# Patient Record
Sex: FEMALE | Race: WHITE | NOT HISPANIC OR LATINO
[De-identification: names, ages, dates, MRNs, and addresses within clinical notes are randomized per-mention and may not be internally consistent; named-entity substitution may affect disease eponyms.]

---

## 2020-10-12 ENCOUNTER — APPOINTMENT (OUTPATIENT)
Dept: ORTHOPEDIC SURGERY | Facility: CLINIC | Age: 80
End: 2020-10-12
Payer: MEDICARE

## 2020-10-12 VITALS — BODY MASS INDEX: 24.66 KG/M2 | WEIGHT: 134 LBS | HEIGHT: 62 IN

## 2020-10-12 DIAGNOSIS — Z78.9 OTHER SPECIFIED HEALTH STATUS: ICD-10-CM

## 2020-10-12 DIAGNOSIS — E78.00 PURE HYPERCHOLESTEROLEMIA, UNSPECIFIED: ICD-10-CM

## 2020-10-12 DIAGNOSIS — Z56.0 UNEMPLOYMENT, UNSPECIFIED: ICD-10-CM

## 2020-10-12 DIAGNOSIS — S83.207A UNSPECIFIED TEAR OF UNSPECIFIED MENISCUS, CURRENT INJURY, LEFT KNEE, INITIAL ENCOUNTER: ICD-10-CM

## 2020-10-12 DIAGNOSIS — R12 HEARTBURN: ICD-10-CM

## 2020-10-12 PROBLEM — Z00.00 ENCOUNTER FOR PREVENTIVE HEALTH EXAMINATION: Status: ACTIVE | Noted: 2020-10-12

## 2020-10-12 PROCEDURE — 20610 DRAIN/INJ JOINT/BURSA W/O US: CPT | Mod: LT

## 2020-10-12 PROCEDURE — 99204 OFFICE O/P NEW MOD 45 MIN: CPT | Mod: 25

## 2020-10-12 PROCEDURE — 73564 X-RAY EXAM KNEE 4 OR MORE: CPT | Mod: LT

## 2020-10-12 RX ORDER — FENOFIBRATE 120 MG/1
TABLET ORAL
Refills: 0 | Status: ACTIVE | COMMUNITY

## 2020-10-12 RX ORDER — ZOLPIDEM TARTRATE 5 MG/1
TABLET, FILM COATED ORAL
Refills: 0 | Status: ACTIVE | COMMUNITY

## 2020-10-12 RX ORDER — MELOXICAM 15 MG/1
15 TABLET ORAL
Qty: 30 | Refills: 1 | Status: ACTIVE | COMMUNITY
Start: 2020-10-12 | End: 1900-01-01

## 2020-10-12 RX ORDER — PANTOPRAZOLE 20 MG/1
20 TABLET, DELAYED RELEASE ORAL
Refills: 0 | Status: ACTIVE | COMMUNITY

## 2020-10-12 SDOH — ECONOMIC STABILITY - INCOME SECURITY: UNEMPLOYMENT, UNSPECIFIED: Z56.0

## 2020-10-12 NOTE — DISCUSSION/SUMMARY
[de-identified] : This patient has mild left knee patellofemoral osteoarthritis as well as a lateral meniscal tear.  The patient is not an appropriate candidate for surgical intervention at this time. An extensive discussion was conducted on the natural history of the disease and the variety of surgical and non-surgical options available to the patient including, but not limited to non-steroidal anti-inflammatory medications, steroid injections, physical therapy, maintenance of ideal body weight, and reduction of activity.  I recommended and prescribed a course of Mobic and physical therapy.  The patient is also encouraged to trial a neoprene sleeve knee brace which can be purchased OTC.  The patient is also encouraged to consider use of a cane.  Today we performed a left knee intra-articular cortisone injection.  The patient will schedule an appointment as needed.\par \par

## 2020-10-12 NOTE — HISTORY OF PRESENT ILLNESS
[de-identified] : This is very nice 79-year-old female experiencing chronic left knee pain, which is severe in intensity. The pain substantially limits activities of daily living. Walking tolerance is reduced.  Stairs exacerbate her pain.  The knee is not giving way.  Denies any catching clicking or locking in the knee.  Does not use a knee sleeve.  Does not use a cane or walker.  Does not take any medications for this.  Has not tried physical therapy recently for this but did try some physical therapy in January.  The patient denies any radiation of the pain to the feet and it is not associated with numbness, tingling, or weakness.

## 2020-10-12 NOTE — PHYSICAL EXAM
[de-identified] : Patient is well nourished, well-developed, in no acute distress, with appropriate mood and affect. The patient is oriented to time, place, and person. Respirations are even and unlabored. Gait evaluation does reveal a limp. There is no inguinal adenopathy. Examination of the contralateral knee shows normal range of motion, strength, no tenderness, and intact skin. The affected limb is well-perfused, without skin lesions, shows a grossly normal motor and sensory examination. Knee motion is significantly reduced and does cause significant pain. The knee moves from 0 to 125 degrees. The knee is stable within that range-of-motion to AP and ML stress. The alignment of the knee is 5 degrees valgus. Muscle strength is normal. Pedal pulses are palpable. Hip examination was negative.\par  [de-identified] : Long standing knee, AP knee, lateral knee, and patellar views of the left knee were ordered and taken in the office and demonstrate mild degenerative joint disease of the patellofemoral compartment of the knee with joint space narrowing, osteophyte formation, and subchondral sclerosis.\par \par The patient brings with her an MRI of the left knee that demonstrates mild patellofemoral degenerative joint disease as well as a lateral meniscal tear.

## 2022-11-07 ENCOUNTER — APPOINTMENT (OUTPATIENT)
Dept: ORTHOPEDIC SURGERY | Facility: CLINIC | Age: 82
End: 2022-11-07

## 2022-11-07 VITALS
HEART RATE: 73 BPM | WEIGHT: 135.91 LBS | BODY MASS INDEX: 25.01 KG/M2 | TEMPERATURE: 97.7 F | HEIGHT: 62 IN | DIASTOLIC BLOOD PRESSURE: 77 MMHG | OXYGEN SATURATION: 98 % | SYSTOLIC BLOOD PRESSURE: 125 MMHG

## 2022-11-07 DIAGNOSIS — M65.322 TRIGGER FINGER, LEFT INDEX FINGER: ICD-10-CM

## 2022-11-07 DIAGNOSIS — M25.562 PAIN IN LEFT KNEE: ICD-10-CM

## 2022-11-07 PROCEDURE — 20610 DRAIN/INJ JOINT/BURSA W/O US: CPT | Mod: LT

## 2022-11-07 PROCEDURE — 73560 X-RAY EXAM OF KNEE 1 OR 2: CPT | Mod: RT

## 2022-11-07 PROCEDURE — 73564 X-RAY EXAM KNEE 4 OR MORE: CPT | Mod: LT

## 2022-11-07 PROCEDURE — 99214 OFFICE O/P EST MOD 30 MIN: CPT | Mod: 25

## 2022-11-07 NOTE — PHYSICAL EXAM
[de-identified] : Constitutional - the patient is of normal build and nutrition.  She comes to our office with her .  The patient remains oriented to person, time, and  place.  Mood is normal. Vital signs as recorded.  The patients gait is no limp but pain in her left knee.  She walks with a slightly bent left knee. The patient has satisfactory  balance and can stand on toes and heels.\par \par The patient has no difficulty with respiration. Respiration at rest is a normal rate. The patient is not short of breath and has not become short of breath with short ambulation. There is no audible wheezing. No coughing.\par \par Skin is normal for the patient's age. There are no abnormal masses or lymph nodes which stand out in the lower extremities.\par \par Spine - deep tendon reflexes are symmetric. Motor and sensory are symmetric.\par \par \par UPPER EXTREMITIES \par \par Shoulders her shoulders have full symmetric motion bilaterally but with abduction and abduction and rotation of her right shoulder she is feeling some aching in her shoulder this is very consistent with some mild shoulder strain or rotator impingement.\par \par There is normal motion in the wrists and elbows.\par \par She is having some problem with her left hand she has some numbness and today she points to her index and middle fingers she also has a snapping finger in her index finger of her left hand.  On palpation of her hand she has a very early Dupuytren's contracture.  I when tapping on her wrist she may have a positive Tinel's.  For this she is being referred to our hand orthopedist.\par \par Circulation appears satisfactory with pedal pulses present.  There is no major edema in the lower legs. No skin tenderness or increased temperature. No major varicosities.\par \par HIP EXAMINATION the abduction and abduction as well as rotation measurements were taken with the hip in flexion.\par \par Motion\par There is symmetric motion with flexion 135 degrees, abduction 80 degrees, adduction 30 degrees, external rotation 80 degrees, internal rotation 20 degrees.\par \par The hips have good range of motion. There is good strength across the hips. There is no crepitus in either hip. The alignment of the hips is normal.\par \par \par KNEE EXAMINATION\par \par Motion\par Right Knee has 0 to 135 degrees of motion with good medial  lateral and anterior posterior stability.  There is no major effusion.  There is no Baker's cyst.  There is no significant patellofemoral crepitus.  The patient has satisfactory strength across the knee.               \par Left  Knee   has 10 to 120 degrees of motion with good medial lateral and anterior posterior stability.  She has a definite anterior effusion and a very small Baker's cyst.  There is patellofemoral crepitus and she has pain with compression of her patella as well as any attempt to subluxate her patella.  He does guard the strength in this knee.  She says that this knee is more uncomfortable when she goes up and down stairs.\par \par \par \par Ankle and foot examination\par Of the ankle has normal motion.  There is normal ankle stability.  The patient has no major abnormalities of the foot.\par \par \par \par  [de-identified] : 4 views were done of the patient's left knee with an AP of the right knee on the AP view medial lateral joint spaces are maintained to both the right and the left knees.  On the Downs view she appears to be bone against bone contact in her lateral compartment and she has a known degenerative torn lateral meniscus.  He has severe patellofemoral degenerative arthritis with me what may be bone against bone contact throughout the lateral facet.  Impression osteoarthritis left knee. PCP

## 2022-11-07 NOTE — PROCEDURE
[de-identified] : Procedure Note:\par \par Anatomic Location:  Left Knee\par \par Diagnosis:  Arthritis\par \par Procedure:  Injection of 2cc  of Marcaine 0.25% plain and Celestone 1cc, 6mg\par \par Local Spray: Ethyl Chloride.\par \par \par Patient has consented for the procedure.\par \par Injection  through a lateral parapatella approach.\par \par Patient tolerated the procedure well.\par \par Patient instructed to call the office if any reaction, fever, chills, increased erythema or swelling.   [phone].

## 2022-11-07 NOTE — HISTORY OF PRESENT ILLNESS
[de-identified] : This is very nice 81-year-old female experiencing chronic left knee pain, which is severe in intensity. The pain substantially limits activities of daily living. Walking tolerance is reduced. Stairs exacerbate her pain. The knee is not giving way. Denies any catching clicking or locking in the knee. Does not use a knee sleeve. Does not use a cane or walker. Does not take any medications for this. Has not tried physical therapy recently for this but did try some physical therapy in the past. The patient denies any radiation of the pain to the feet and it is not associated with numbness, tingling, or weakness. She most recently was seen by Dr. Gray Reddy in October 2020 for this issue, who administered a cortisone injection to the patient's left knee, prescribed her Meloxicam, and indicated her for physical therapy.

## 2022-11-07 NOTE — DISCUSSION/SUMMARY
[de-identified] : As far as the patient's knee she does have a flexion contracture limited motion and marked pain with stairs and walking she could benefit in the future from left total knee replacement at this time she may have received good response from previous injections with cortisone she is given another injection which she tolerated well.  She will see hand orthopedic about her left hand trigger finger possible carpal tunnel and very early Dupuytren's.  Return visit in 3 months

## 2023-02-15 ENCOUNTER — APPOINTMENT (OUTPATIENT)
Dept: ORTHOPEDIC SURGERY | Facility: CLINIC | Age: 83
End: 2023-02-15

## 2023-02-16 ENCOUNTER — APPOINTMENT (OUTPATIENT)
Dept: ORTHOPEDIC SURGERY | Facility: CLINIC | Age: 83
End: 2023-02-16
Payer: MEDICARE

## 2023-02-16 VITALS
HEART RATE: 64 BPM | DIASTOLIC BLOOD PRESSURE: 74 MMHG | SYSTOLIC BLOOD PRESSURE: 118 MMHG | TEMPERATURE: 97.7 F | WEIGHT: 135 LBS | HEIGHT: 62 IN | BODY MASS INDEX: 24.84 KG/M2

## 2023-02-16 DIAGNOSIS — M17.12 UNILATERAL PRIMARY OSTEOARTHRITIS, LEFT KNEE: ICD-10-CM

## 2023-02-16 PROCEDURE — 99213 OFFICE O/P EST LOW 20 MIN: CPT | Mod: 25

## 2023-02-16 PROCEDURE — 73562 X-RAY EXAM OF KNEE 3: CPT | Mod: LT

## 2023-02-16 PROCEDURE — 20610 DRAIN/INJ JOINT/BURSA W/O US: CPT | Mod: LT

## 2024-02-28 ENCOUNTER — INPATIENT (INPATIENT)
Facility: HOSPITAL | Age: 84
LOS: 0 days | Discharge: HOME | DRG: 607 | End: 2024-02-29
Attending: INTERNAL MEDICINE | Admitting: INTERNAL MEDICINE
Payer: MEDICARE

## 2024-02-28 VITALS
TEMPERATURE: 98 F | DIASTOLIC BLOOD PRESSURE: 77 MMHG | HEIGHT: 62 IN | HEART RATE: 71 BPM | SYSTOLIC BLOOD PRESSURE: 135 MMHG | WEIGHT: 139.99 LBS | RESPIRATION RATE: 18 BRPM | OXYGEN SATURATION: 97 %

## 2024-02-28 DIAGNOSIS — R21 RASH AND OTHER NONSPECIFIC SKIN ERUPTION: ICD-10-CM

## 2024-02-28 LAB
ALBUMIN SERPL ELPH-MCNC: 4.3 G/DL — SIGNIFICANT CHANGE UP (ref 3.3–5)
ALP SERPL-CCNC: 99 U/L — SIGNIFICANT CHANGE UP (ref 40–120)
ALT FLD-CCNC: 20 U/L — SIGNIFICANT CHANGE UP (ref 10–45)
ANION GAP SERPL CALC-SCNC: 12 MMOL/L — SIGNIFICANT CHANGE UP (ref 5–17)
APPEARANCE UR: ABNORMAL
APTT BLD: 35.9 SEC — HIGH (ref 24.5–35.6)
AST SERPL-CCNC: 25 U/L — SIGNIFICANT CHANGE UP (ref 10–40)
BACTERIA # UR AUTO: NEGATIVE /HPF — SIGNIFICANT CHANGE UP
BASOPHILS # BLD AUTO: 0.03 K/UL — SIGNIFICANT CHANGE UP (ref 0–0.2)
BASOPHILS NFR BLD AUTO: 0.2 % — SIGNIFICANT CHANGE UP (ref 0–2)
BILIRUB SERPL-MCNC: 0.3 MG/DL — SIGNIFICANT CHANGE UP (ref 0.2–1.2)
BILIRUB UR-MCNC: NEGATIVE — SIGNIFICANT CHANGE UP
BUN SERPL-MCNC: 17 MG/DL — SIGNIFICANT CHANGE UP (ref 7–23)
CALCIUM SERPL-MCNC: 9.7 MG/DL — SIGNIFICANT CHANGE UP (ref 8.4–10.5)
CAST: 0 /LPF — SIGNIFICANT CHANGE UP (ref 0–4)
CHLORIDE SERPL-SCNC: 102 MMOL/L — SIGNIFICANT CHANGE UP (ref 96–108)
CO2 SERPL-SCNC: 25 MMOL/L — SIGNIFICANT CHANGE UP (ref 22–31)
COLOR SPEC: YELLOW — SIGNIFICANT CHANGE UP
CREAT SERPL-MCNC: 0.48 MG/DL — LOW (ref 0.5–1.3)
CRP SERPL-MCNC: <3 MG/L — SIGNIFICANT CHANGE UP (ref 0–4)
DIFF PNL FLD: ABNORMAL
EGFR: 94 ML/MIN/1.73M2 — SIGNIFICANT CHANGE UP
EOSINOPHIL # BLD AUTO: 0.35 K/UL — SIGNIFICANT CHANGE UP (ref 0–0.5)
EOSINOPHIL NFR BLD AUTO: 2.8 % — SIGNIFICANT CHANGE UP (ref 0–6)
ERYTHROCYTE [SEDIMENTATION RATE] IN BLOOD: 20 MM/HR — SIGNIFICANT CHANGE UP (ref 0–20)
GLUCOSE SERPL-MCNC: 111 MG/DL — HIGH (ref 70–99)
GLUCOSE UR QL: NEGATIVE MG/DL — SIGNIFICANT CHANGE UP
HCT VFR BLD CALC: 40.6 % — SIGNIFICANT CHANGE UP (ref 34.5–45)
HGB BLD-MCNC: 13.7 G/DL — SIGNIFICANT CHANGE UP (ref 11.5–15.5)
IMM GRANULOCYTES NFR BLD AUTO: 0.4 % — SIGNIFICANT CHANGE UP (ref 0–0.9)
INR BLD: 1.07 RATIO — SIGNIFICANT CHANGE UP (ref 0.85–1.18)
KETONES UR-MCNC: NEGATIVE MG/DL — SIGNIFICANT CHANGE UP
LEUKOCYTE ESTERASE UR-ACNC: NEGATIVE — SIGNIFICANT CHANGE UP
LYMPHOCYTES # BLD AUTO: 1.03 K/UL — SIGNIFICANT CHANGE UP (ref 1–3.3)
LYMPHOCYTES # BLD AUTO: 8.2 % — LOW (ref 13–44)
MCHC RBC-ENTMCNC: 33.1 PG — SIGNIFICANT CHANGE UP (ref 27–34)
MCHC RBC-ENTMCNC: 33.7 GM/DL — SIGNIFICANT CHANGE UP (ref 32–36)
MCV RBC AUTO: 98.1 FL — SIGNIFICANT CHANGE UP (ref 80–100)
MONOCYTES # BLD AUTO: 0.52 K/UL — SIGNIFICANT CHANGE UP (ref 0–0.9)
MONOCYTES NFR BLD AUTO: 4.1 % — SIGNIFICANT CHANGE UP (ref 2–14)
NEUTROPHILS # BLD AUTO: 10.56 K/UL — HIGH (ref 1.8–7.4)
NEUTROPHILS NFR BLD AUTO: 84.3 % — HIGH (ref 43–77)
NITRITE UR-MCNC: NEGATIVE — SIGNIFICANT CHANGE UP
NRBC # BLD: 0 /100 WBCS — SIGNIFICANT CHANGE UP (ref 0–0)
PH UR: 8 — SIGNIFICANT CHANGE UP (ref 5–8)
PLATELET # BLD AUTO: 252 K/UL — SIGNIFICANT CHANGE UP (ref 150–400)
POTASSIUM SERPL-MCNC: 4.1 MMOL/L — SIGNIFICANT CHANGE UP (ref 3.5–5.3)
POTASSIUM SERPL-SCNC: 4.1 MMOL/L — SIGNIFICANT CHANGE UP (ref 3.5–5.3)
PROCALCITONIN SERPL-MCNC: 0.02 NG/ML — SIGNIFICANT CHANGE UP (ref 0.02–0.1)
PROT SERPL-MCNC: 7.4 G/DL — SIGNIFICANT CHANGE UP (ref 6–8.3)
PROT UR-MCNC: NEGATIVE MG/DL — SIGNIFICANT CHANGE UP
PROTHROM AB SERPL-ACNC: 11.2 SEC — SIGNIFICANT CHANGE UP (ref 9.5–13)
RBC # BLD: 4.14 M/UL — SIGNIFICANT CHANGE UP (ref 3.8–5.2)
RBC # FLD: 12.7 % — SIGNIFICANT CHANGE UP (ref 10.3–14.5)
RBC CASTS # UR COMP ASSIST: 11 /HPF — HIGH (ref 0–4)
SODIUM SERPL-SCNC: 139 MMOL/L — SIGNIFICANT CHANGE UP (ref 135–145)
SP GR SPEC: 1.02 — SIGNIFICANT CHANGE UP (ref 1–1.03)
SQUAMOUS # UR AUTO: 0 /HPF — SIGNIFICANT CHANGE UP (ref 0–5)
UROBILINOGEN FLD QL: 0.2 MG/DL — SIGNIFICANT CHANGE UP (ref 0.2–1)
WBC # BLD: 12.54 K/UL — HIGH (ref 3.8–10.5)
WBC # FLD AUTO: 12.54 K/UL — HIGH (ref 3.8–10.5)
WBC UR QL: 2 /HPF — SIGNIFICANT CHANGE UP (ref 0–5)

## 2024-02-28 PROCEDURE — 71045 X-RAY EXAM CHEST 1 VIEW: CPT | Mod: 26

## 2024-02-28 PROCEDURE — 73590 X-RAY EXAM OF LOWER LEG: CPT | Mod: 26,50

## 2024-02-28 PROCEDURE — 99285 EMERGENCY DEPT VISIT HI MDM: CPT

## 2024-02-28 RX ORDER — SODIUM CHLORIDE 9 MG/ML
1000 INJECTION INTRAMUSCULAR; INTRAVENOUS; SUBCUTANEOUS ONCE
Refills: 0 | Status: COMPLETED | OUTPATIENT
Start: 2024-02-28 | End: 2024-02-28

## 2024-02-28 RX ORDER — DIPHENHYDRAMINE HCL 50 MG
25 CAPSULE ORAL ONCE
Refills: 0 | Status: COMPLETED | OUTPATIENT
Start: 2024-02-28 | End: 2024-02-28

## 2024-02-28 RX ADMIN — SODIUM CHLORIDE 1000 MILLILITER(S): 9 INJECTION INTRAMUSCULAR; INTRAVENOUS; SUBCUTANEOUS at 12:34

## 2024-02-28 RX ADMIN — SODIUM CHLORIDE 1000 MILLILITER(S): 9 INJECTION INTRAMUSCULAR; INTRAVENOUS; SUBCUTANEOUS at 13:02

## 2024-02-28 RX ADMIN — Medication 25 MILLIGRAM(S): at 15:03

## 2024-02-28 NOTE — ED ADULT TRIAGE NOTE - AS HEIGHT TYPE
Show Applicator Variable?: Yes Duration Of Freeze Thaw-Cycle (Seconds): 0 Render Note In Bullet Format When Appropriate: No Detail Level: Detailed Post-Care Instructions: You have just had cryotherapy for the treatment of a pre-cancerous or other benign (non-cancerous) skin lesions. You can expect the pain to rapidly decrease over the next 45 minutes. The area treated will initially turn red and over the next 72 hours turn brown to black. A scab will form that will peel off by itself within 5 to 7 days. A blister or reddish purple blood blister may form where the area has been treated. When the scab forms, you can apply Vaseline or Scar Recovery Gel twice a day to the wound. This product will improve the healing of the wound, decreasing the appearance of red or pink pigment changes in the wound. The gel has also been shown to significantly decrease itching while the wound heals. You can purchase the Scar Recovery Gel at our office. \\n\\nCan I wash or use makeup? Yes\\n\\nShould I break the blister should one form? \\nIf the blister is bothersome, you may break the blister with a clean needle if the lesion is on the body. However, we do not recommend doing this for lesions on the face. Keep the area dry and as clean as possible in order to prevent infection. Natural skin is the best protection to prevent infection. \\n\\nWill this leave a scar? \\nIt could, but it is very unlikely. However, it may leave a slight discoloration, which should be temporary. \\n\\nWhat if the skin growth doesn't go away after this has healed? \\nThe providers treated this area believing it did not have cancerous roots and was strictly limited to the surface of the skin as a pre-cancerous or benign growth would be. The growth may not disappear or it may return over a period of time. You need to have this area checked again at your follow-up appointment to ensure that it does not need further treatment or that it has resolved. Consent: The patient's consent was obtained including but not limited to risks of crusting, scabbing, blistering, scarring, darker or lighter pigmentary change, recurrence, incomplete removal and infection. Post-Care Instructions: Liquid Nitrogen (Freezing or Cryotherapy): This involves having a very cold spray applied to the lesion. This can be painful (topical anesthetic can be applied before or after treatment). After treatment, the area may form into a blister. The blister can be popped with a sterile needle, and covered with a dry bandage. You can resume normal activity; it is fine to get the area wet. It generally takes 4-6 treatments for the wart to resolve, treatments are done 2-3 weeks apart. Spray Paint Text: The liquid nitrogen was applied to the skin utilizing a spray paint frosting technique. Medical Necessity Clause: This procedure was medically necessary because the lesions that were treated were: Medical Necessity Information: It is in your best interest to select a reason for this procedure from the list below. All of these items fulfill various CMS LCD requirements except the new and changing color options. stated

## 2024-02-28 NOTE — H&P ADULT - NSHPLABSRESULTS_GEN_ALL_CORE
Lab Results:  CBC  CBC Full  -  ( 28 Feb 2024 13:26 )  WBC Count : 12.54 K/uL  RBC Count : 4.14 M/uL  Hemoglobin : 13.7 g/dL  Hematocrit : 40.6 %  Platelet Count - Automated : 252 K/uL  Mean Cell Volume : 98.1 fl  Mean Cell Hemoglobin : 33.1 pg  Mean Cell Hemoglobin Concentration : 33.7 gm/dL  Auto Neutrophil # : 10.56 K/uL  Auto Lymphocyte # : 1.03 K/uL  Auto Monocyte # : 0.52 K/uL  Auto Eosinophil # : 0.35 K/uL  Auto Basophil # : 0.03 K/uL  Auto Neutrophil % : 84.3 %  Auto Lymphocyte % : 8.2 %  Auto Monocyte % : 4.1 %  Auto Eosinophil % : 2.8 %  Auto Basophil % : 0.2 %    .		Differential:	[] Automated		[] Manual  Chemistry                        13.7   12.54 )-----------( 252      ( 28 Feb 2024 13:26 )             40.6     02-28    139  |  102  |  17  ----------------------------<  111<H>  4.1   |  25  |  0.48<L>    Ca    9.7      28 Feb 2024 13:26    TPro  7.4  /  Alb  4.3  /  TBili  0.3  /  DBili  x   /  AST  25  /  ALT  20  /  AlkPhos  99  02-28    LIVER FUNCTIONS - ( 28 Feb 2024 13:26 )  Alb: 4.3 g/dL / Pro: 7.4 g/dL / ALK PHOS: 99 U/L / ALT: 20 U/L / AST: 25 U/L / GGT: x           PT/INR - ( 28 Feb 2024 13:26 )   PT: 11.2 sec;   INR: 1.07 ratio         PTT - ( 28 Feb 2024 13:26 )  PTT:35.9 sec  Urinalysis Basic - ( 28 Feb 2024 13:26 )    Color: x / Appearance: x / SG: x / pH: x  Gluc: 111 mg/dL / Ketone: x  / Bili: x / Urobili: x   Blood: x / Protein: x / Nitrite: x   Leuk Esterase: x / RBC: x / WBC x   Sq Epi: x / Non Sq Epi: x / Bacteria: x            MICROBIOLOGY/CULTURES:      RADIOLOGY RESULTS: reviewed

## 2024-02-28 NOTE — H&P ADULT - ASSESSMENT
83-year-old female with history of carotid artery stenosis on Plavix, cognitive issues taking zolpidem and donepezil, who presents with bilateral lower extremity erythematous itchy rash which has been present since Saturday.  Patient states that she had a lesion biopsied by her dermatologist Dr. Xiomara De Leon last Wednesday.  Patient also stated that several hours prior to the rash onset, patient was eating cold cuts/mortadella.  Patient was subsequently seen by her PMD Dr. Toña Timmons who prescribed patient prednisone 5 mg daily, patient also took OTC anti-itch cream.  Patient states that her symptoms have been worsening and the rashes been progressing up bilateral lower extremity.    1 Generalized Rash   - unclear etiology   - rheum consult in am  - pt scared to be discharged  - will monitor     2 DVT prophylaxis   Compression stockings

## 2024-02-28 NOTE — H&P ADULT - NSHPPHYSICALEXAM_GEN_ALL_CORE
General: WN/WD NAD  PERRLA  Neurology: A&Ox3, nonfocal, ELIZALDE x 4  Respiratory: CTA B/L  CV: RRR, S1S2, no murmurs, rubs or gallops  Abdominal: Soft, NT, ND +BS, Last BM  Extremities: No edema, + peripheral pulses  Skin Normal

## 2024-02-28 NOTE — ED PROVIDER NOTE - PROGRESS NOTE DETAILS
Geoffrey Thakur MD: Labs reviewed, leukocytosis of 12.54, chemistry panel nonactionable.  CRP less than 3, procalcitonin 0.02.    My independent interpretation of the bilateral tibia/fibula x-ray images shows no acute fracture.   More details to be seen in the official radiologist read.    The patient will need to be admitted to the hospital for continued evaluation and management.  Discussed with the accepting physician regarding the initial presentation, diagnostic studies, treatments given in the ED, and current plan of care.  I, Dr. Geoffrey Thakur, spoke directly to the admitting attending physician, Dr. Daksha Scott, who accepted the patient to their service.  The patient was accepted by and endorsed to the medicine team.

## 2024-02-28 NOTE — H&P ADULT - HISTORY OF PRESENT ILLNESS
83-year-old female with history of carotid artery stenosis on Plavix, cognitive issues taking zolpidem and donepezil, who presents with bilateral lower extremity erythematous itchy rash which has been present since Saturday.  Patient states that she had a lesion biopsied by her dermatologist Dr. Xiomara De Leon last Wednesday.  Patient also stated that several hours prior to the rash onset, patient was eating cold cuts/mortadella.  Patient was subsequently seen by her PMD Dr. Toña Timmons who prescribed patient prednisone 5 mg daily, patient also took OTC anti-itch cream.  Patient states that her symptoms have been worsening and the rashes been progressing up bilateral lower extremity.

## 2024-02-28 NOTE — ED ADULT TRIAGE NOTE - CHIEF COMPLAINT QUOTE
bilateral lower extremity redness, rash and itching for few days now, saw her PMD and was prescribed an anti itch cream and Prednisone 5mg daily since Monday.

## 2024-02-28 NOTE — ED PROVIDER NOTE - ATTENDING APP SHARED VISIT CONTRIBUTION OF CARE
Geoffrey Thakur MD, Emergency Medicine Attending Physician:     HPI: 83-year-old female with history of carotid artery stenosis on Plavix, cognitive issues taking zolpidem and donepezil, who presents with bilateral lower extremity erythematous itchy rash which has been present since Saturday.  Patient states that she had a lesion biopsied by her dermatologist Dr. Xiomara De Leon last Wednesday.  Patient also stated that several hours prior to the rash onset, patient was eating cold cuts/mortadella.  Patient was subsequently seen by her PMD Dr. Toña Timmons who prescribed patient prednisone 5 mg daily, patient also took OTC anti-itch cream.  Patient states that her symptoms have been worsening and the rashes been progressing up bilateral lower extremity..     ROS: denies trauma, fevers, chills, cough, chest pain, shortness of breath, abdominal pain, nausea, vomiting, diaphoresis, dizziness, syncope, leg/calf pain/swelling, paresthesia, numbness, or weakness.    Exam: Vitals stable  GEN: In no acute distress, AAOx3  HENT: NCAT, no stridor  EYES: No icterus, no conjunctivitis  RESP: No respiratory distress, CTAB with no wheezing or rales or rhonchi  CV: No tachycardia, normal perfusion  Extremity: There is no calf tenderness or leg swelling. Negative Michael's sign.  NEURO: No focal neuro deficits, CN III through XII intact, normal 5/5 motor and normal sensation in all 4 extremities  Skin: Bilateral lower leg with confluent area of erythema which is nonblanchable, with scattered petechiae, mild warmth on palpation with minimal tenderness, no crepitus/fluctuance/desquamation.  Area of blanchable erythema and macular papular rash over the medial right thigh.    MDM: Will obtain labs to evaluate for hematologic disorder, metabolic derangements, hepatic and renal function, and screen for infection.  Will obtain x-ray of bilateral tib-fib to evaluate for acute bony pathology though unlikely.  Will obtain chest x-ray to evaluate for acute cardiopulmonary pathology.  Will give patient antihistamine and reassess. Geoffrey Thakur MD, Emergency Medicine Attending Physician:     HPI: 83-year-old female with history of carotid artery stenosis on Plavix, cognitive issues taking zolpidem and donepezil, who presents with bilateral lower extremity erythematous itchy rash which has been present since Saturday.  Patient states that she had a lesion biopsied by her dermatologist Dr. Xiomara De Leon last Wednesday.  Patient also stated that several hours prior to the rash onset, patient was eating cold cuts/mortadella.  Patient was subsequently seen by her PMD Dr. Toña Timmons who prescribed patient prednisone 5 mg daily, patient also took OTC anti-itch cream.  Patient states that her symptoms have been worsening and the rashes been progressing up bilateral lower extremity..     ROS: denies trauma, fevers, chills, cough, chest pain, shortness of breath, abdominal pain, nausea, vomiting, diaphoresis, dizziness, syncope, leg/calf pain/swelling, paresthesia, numbness, or weakness.    Exam: Vitals stable  GEN: In no acute distress, AAOx3  HENT: NCAT, no stridor  EYES: No icterus, no conjunctivitis  RESP: No respiratory distress, CTAB with no wheezing or rales or rhonchi  CV: No tachycardia, normal perfusion  Extremity: There is no calf tenderness or leg swelling. Negative Michael's sign.  NEURO: No focal neuro deficits, CN III through XII intact, normal 5/5 motor and normal sensation in all 4 extremities  Skin: Bilateral lower leg with confluent area of erythema which is nonblanchable, with scattered petechiae, mild warmth on palpation with minimal tenderness, no crepitus/fluctuance/desquamation.  Area of blanchable erythema and macular papular rash over the medial right thigh.    MDM: Will obtain labs to evaluate for hematologic disorder, metabolic derangements, hepatic and renal function, and screen for infection.  Will obtain x-ray of bilateral tib-fib to evaluate for acute bony pathology though unlikely.  Will obtain chest x-ray to evaluate for acute cardiopulmonary pathology.  Will give patient antihistamine and reassess.    My independent interpretation of the EKG shows:  Sinus rhythm with first-degree AV block, , with rate of 65 bpm, normal axis, no T wave inversions, no ST elevations or depressions.

## 2024-02-28 NOTE — ED ADULT NURSE NOTE - OBJECTIVE STATEMENT
1058 pt [id number] aox4 since Sunday bilateral lower extremity redness, rash and itching for few days now, saw her PMD and was prescribed an anti itch cream and Prednisone 5mg daily since Monday. 2/25/24

## 2024-02-28 NOTE — ED ADULT NURSE REASSESSMENT NOTE - NS ED NURSE REASSESS COMMENT FT1
Handoff taken from DURAN Judge in blue. Patient resting comfortably, breathing unlabored, VSS, no signs of acute distress, side rails raised, call bell within reach, comfort and safety maintained. To be moved to [facility].

## 2024-02-28 NOTE — ED ADULT NURSE NOTE - NSFALLHARMRISKINTERV_ED_ALL_ED

## 2024-02-29 ENCOUNTER — TRANSCRIPTION ENCOUNTER (OUTPATIENT)
Age: 84
End: 2024-02-29

## 2024-02-29 VITALS — OXYGEN SATURATION: 98 % | RESPIRATION RATE: 18 BRPM

## 2024-02-29 PROCEDURE — 85730 THROMBOPLASTIN TIME PARTIAL: CPT

## 2024-02-29 PROCEDURE — 87086 URINE CULTURE/COLONY COUNT: CPT

## 2024-02-29 PROCEDURE — 80053 COMPREHEN METABOLIC PANEL: CPT

## 2024-02-29 PROCEDURE — 85610 PROTHROMBIN TIME: CPT

## 2024-02-29 PROCEDURE — 84145 PROCALCITONIN (PCT): CPT

## 2024-02-29 PROCEDURE — 73590 X-RAY EXAM OF LOWER LEG: CPT

## 2024-02-29 PROCEDURE — 86140 C-REACTIVE PROTEIN: CPT

## 2024-02-29 PROCEDURE — 99221 1ST HOSP IP/OBS SF/LOW 40: CPT

## 2024-02-29 PROCEDURE — 85025 COMPLETE CBC W/AUTO DIFF WBC: CPT

## 2024-02-29 PROCEDURE — 81001 URINALYSIS AUTO W/SCOPE: CPT

## 2024-02-29 PROCEDURE — 99285 EMERGENCY DEPT VISIT HI MDM: CPT

## 2024-02-29 PROCEDURE — 71045 X-RAY EXAM CHEST 1 VIEW: CPT

## 2024-02-29 PROCEDURE — 85652 RBC SED RATE AUTOMATED: CPT

## 2024-02-29 RX ORDER — DIPHENHYDRAMINE HCL 50 MG
25 CAPSULE ORAL ONCE
Refills: 0 | Status: DISCONTINUED | OUTPATIENT
Start: 2024-02-29 | End: 2024-02-29

## 2024-02-29 RX ORDER — HYDROXYZINE HCL 10 MG
25 TABLET ORAL EVERY 6 HOURS
Refills: 0 | Status: DISCONTINUED | OUTPATIENT
Start: 2024-02-29 | End: 2024-02-29

## 2024-02-29 RX ORDER — HYDROXYZINE HCL 10 MG
1 TABLET ORAL
Qty: 28 | Refills: 0
Start: 2024-02-29 | End: 2024-03-06

## 2024-02-29 RX ORDER — ACETAMINOPHEN 500 MG
650 TABLET ORAL EVERY 6 HOURS
Refills: 0 | Status: DISCONTINUED | OUTPATIENT
Start: 2024-02-29 | End: 2024-02-29

## 2024-02-29 NOTE — DISCHARGE NOTE PROVIDER - NSDCQMCOGNITION_NEU_ALL_CORE
----- Message from Rhett Duong MD sent at 1/3/2019  5:38 AM CST -----  Inform patient that labs are normal. Try increasing fiber in his diet as discussed yesterday.  
Left message for patient to return call to receive test results  
Pt verbalized understanding with no further questions or concerns.    
No difficulties

## 2024-02-29 NOTE — CONSULT NOTE ADULT - ATTENDING COMMENTS
Modifications made to fellows note above   to follow up as outpatient in 1-2 weeks if rash is not resolving.     Meghann Espana MD  [facility] Division of Rheumatology   [phone]   [email or web address]

## 2024-02-29 NOTE — DISCHARGE NOTE NURSING/CASE MANAGEMENT/SOCIAL WORK - NSDCFUADDAPPT_GEN_ALL_CORE_FT
APPTS ARE READY TO BE MADE: [x] YES    Best Family or Patient Contact (if needed):    Additional Information about above appointments (if needed):    1: pcp, derm, rheum follow up  2:   3:     Other comments or requests:

## 2024-02-29 NOTE — CONSULT NOTE ADULT - ASSESSMENT
NOTE IS INCOMPLETE. PLEASE CHECK BACK LATER FOR FINAL ASSESSMENT AND RECOMMENDATIONS.    #Favor allergic contact dermatitis w/ id reaction. No evidence of vasculitis or systemic symptoms  - start triamcinolone 0.1% ointment BID to affected area for up to 2 weeks.  - start hydroxyzine 25 mg PO q6h PRN itch.  - Advised patient she should pursue patch testing as outpatient to determine etiology of recurrent allergic contact dermatitis.        The patient's chart was reviewed in addition to being seen and examined at bedside with the dermatology attending Dr. Cruz.  Recommendations were communicated with the primary team.  Please page [phone] with a [phone] call-back number for further related questions.    _______________ Thank you for allowing us to participate in the care of this patient.  Please re-consult Dermatology for any new or worsening developments.    Alyson Jiang MD  Resident Physician, PGY-2  [facility]  Pager: [phone]  Office: [phone]
82 yo F PMH carotid artery stenosis on Plavix, cognitive issues (on zolpidem/donepezil), b/l knee OA, shingles p/w 6 days itchy red rash    #acute generalized pruritic rash  =2/24 developed pruritic rash on  bilateral lower legs, which spread to upper legs, arms and neck  =evaluated by dermatology, favor allergic contact dermatitis - prescribed triamcinolone and hydroxyzine  =normal ESR/CRP, and no features or stigmata or rheumatologic disease on history or exam    Plan:  -low suspicion for vasculitis rash, however if rash does not resolve in 1-2 weeks, patient given instructions to follow up with rheumatology for further work up  -allergic rash may be 2/2 newly prescribed memantine - advised to discontinue for now    Meghann Espana MD  Rheumatology  [phone]  [address] [facility], [address], [place] [address]    Discussed with Attending Dr. Jovi Landry, DO  Rheumatology Fellow  Available on TEAMS

## 2024-02-29 NOTE — PROGRESS NOTE ADULT - ASSESSMENT
83-year-old female with history of carotid artery stenosis on Plavix, cognitive issues taking zolpidem and donepezil, who presents with bilateral lower extremity erythematous itchy rash which has been present since Saturday.  Patient states that she had a lesion biopsied by her dermatologist Dr. Xiomara De Leon last Wednesday.  Patient also stated that several hours prior to the rash onset, patient was eating cold cuts/mortadella.  Patient was subsequently seen by her PMD Dr. Toña Timmons who prescribed patient prednisone 5 mg daily, patient also took OTC anti-itch cream.  Patient states that her symptoms have been worsening and the rashes been progressing up bilateral lower extremity.    1 Generalized Rash   - unclear etiology   - rheum consult appreciated   - derm fu appreciated   - will monitor     2 DVT prophylaxis   Compression stockings

## 2024-02-29 NOTE — DISCHARGE NOTE NURSING/CASE MANAGEMENT/SOCIAL WORK - PATIENT PORTAL LINK FT
You can access the FollowMyHealth Patient Portal offered by [facility] by registering at the following website: [email or web address]. By joining [employer]’s FollowMyHealth portal, you will also be able to view your health information using other applications (apps) compatible with our system.

## 2024-02-29 NOTE — DISCHARGE NOTE PROVIDER - CARE PROVIDERS DIRECT ADDRESSES
,[email or web address]

## 2024-02-29 NOTE — DISCHARGE NOTE NURSING/CASE MANAGEMENT/SOCIAL WORK - NSDCPEFALRISK_GEN_ALL_CORE
For information on Fall & Injury Prevention, visit: [email or web address] OR  [email or web address] OR  https://www.cdc.gov/steadi/patient.html

## 2024-02-29 NOTE — PROGRESS NOTE ADULT - SUBJECTIVE AND OBJECTIVE BOX
Patient is a 83y old  Female who presents with a chief complaint of Rash (2024 12:45)    Date of servie : 24 @ 14:06  INTERVAL HPI/OVERNIGHT EVENTS:  T(C): 36.4 (24 @ 12:04), Max: 36.8 (24 @ 18:51)  HR: 60 (24 @ 12:04) (60 - 80)  BP: 111/67 (24 @ 12:04) (110/66 - 134/79)  RR: 18 (24 @ 12:04) (18 - 20)  SpO2: 97% (24 @ 12:04) (96% - 100%)  Wt(kg): --  I&O's Summary    2024 07:01  -  2024 14:06  --------------------------------------------------------  IN: 250 mL / OUT: 0 mL / NET: 250 mL        LABS:                        13.7   12.54 )-----------( 252      ( 2024 13:26 )             40.6         139  |  102  |  17  ----------------------------<  111<H>  4.1   |  25  |  0.48<L>    Ca    9.7      2024 13:26    TPro  7.4  /  Alb  4.3  /  TBili  0.3  /  DBili  x   /  AST  25  /  ALT  20  /  AlkPhos  99  -    PT/INR - ( 2024 13:26 )   PT: 11.2 sec;   INR: 1.07 ratio         PTT - ( 2024 13:26 )  PTT:35.9 sec  Urinalysis Basic - ( 2024 20:38 )    Color: Yellow / Appearance: Cloudy / S.017 / pH: x  Gluc: x / Ketone: Negative mg/dL  / Bili: Negative / Urobili: 0.2 mg/dL   Blood: x / Protein: Negative mg/dL / Nitrite: Negative   Leuk Esterase: Negative / RBC: 11 /HPF / WBC 2 /HPF   Sq Epi: x / Non Sq Epi: 0 /HPF / Bacteria: Negative /HPF      CAPILLARY BLOOD GLUCOSE            Urinalysis Basic - ( 2024 20:38 )    Color: Yellow / Appearance: Cloudy / S.017 / pH: x  Gluc: x / Ketone: Negative mg/dL  / Bili: Negative / Urobili: 0.2 mg/dL   Blood: x / Protein: Negative mg/dL / Nitrite: Negative   Leuk Esterase: Negative / RBC: 11 /HPF / WBC 2 /HPF   Sq Epi: x / Non Sq Epi: 0 /HPF / Bacteria: Negative /HPF        MEDICATIONS  (STANDING):  triamcinolone 0.1% Ointment 1 Application(s) Topical every 12 hours    MEDICATIONS  (PRN):  acetaminophen     Tablet .. 650 milliGRAM(s) Oral every 6 hours PRN Mild Pain (1 - 3)  diphenhydrAMINE 25 milliGRAM(s) Oral once PRN Rash and/or Itching  hydrOXYzine hydrochloride 25 milliGRAM(s) Oral every 6 hours PRN Itching          PHYSICAL EXAM:  GENERAL: NAD, well-groomed, well-developed  HEAD:  Atraumatic, Normocephalic  CHEST/LUNG: Clear to percussion bilaterally; No rales, rhonchi, wheezing, or rubs  HEART: Regular rate and rhythm; No murmurs, rubs, or gallops  ABDOMEN: Soft, Nontender, Nondistended; Bowel sounds present  EXTREMITIES:  2+ Peripheral Pulses, No clubbing, cyanosis, or edema  LYMPH: No lymphadenopathy noted  SKIN: No rashes or lesions    Care Discussed with Consultants/Other Providers [ ] YES  [ ] NO

## 2024-02-29 NOTE — CONSULT NOTE ADULT - SUBJECTIVE AND OBJECTIVE BOX
HPI:  83-year-old female with history of carotid artery stenosis on Plavix, cognitive issues taking zolpidem and donepezil, who presents with x6 day itchy red rash that began on her bilateral lower legs and began to spread to upper legs, arms and neck. She was seen by her primary care doctor, who prescribed prednisone 5mg daily on Wednesday which patient had been taking but rash continued to spread. She recently had a skin check from an outside dermatologist during which a lesion was biopsied on the left lower leg and patient was applying Aquaphor to wound daily since. He denies joint pain, abdominal pain, muscle aches, URI symptoms. She denies recent illness in the last six weeks, denies any new medication outside of prednisone.    Dermatology was consulted for an itchy x 6day red rash on the b/l extremities, upper extremities and neck. Seen by PCP, prescribed prednisone 5mg daily patient has been taking since Wednesday without improvement    Skin exam: red edematous papules coalescing into plaques on bilateral lower legs, upper arms, and left neck      PAST MEDICAL & SURGICAL HISTORY:      REVIEW OF SYSTEMS      General: no fevers/chills, no lethary	    Skin/Breast: see HPI  	  Ophthalmologic: no eye pain or change in vision  	  ENMT: no dysphagia or change in hearing    Respiratory and Thorax: no SOB or cough  	  Cardiovascular: no palpitations or chest pain    Gastrointestinal: no abdomenal pain or blood in stool     Genitourinary: no dysuria or frequency    Musculoskeletal: no joint pains or weakness	    Neurological:no weakness, numbness , or tingling    MEDICATIONS  (STANDING):    ALLERGIES: No Known Allergies      Social History:  neg    FAMILY HISTORY:        VITAL SIGNS LAST 24 HOURS:  T(F): 97.6 ( @ 12:04), Max: 98.2 ( @ 18:51)  HR: 60 ( @ 12:04) (60 - 80)  BP: 111/67 ( @ 12:04) (110/66 - 134/79)  RR: 18 ( @ 12:04) (18 - 20)    ___________________________________  Physical Exam:     The patient was alert and oriented X 3, and in no  apparent distress.  OP showed no ulcerations  There was no visible lymphadenopathy.  Conjunctiva were non injected  There was no clubbing or edema of extremities.  The scalp, hair, face, eyebrows, lips, OP, neck, chest, back,   extremities X 4, nails were examined.  There was no hyperhidrosis or bromhidrosis.    Of note on skin exam: see HPI  ____________________________________    LABS:                        13.7   12.54 )-----------( 252      ( 2024 13:26 )             40.6         139  |  102  |  17  ----------------------------<  111<H>  4.1   |  25  |  0.48<L>    Ca    9.7      2024 13:26    TPro  7.4  /  Alb  4.3  /  TBili  0.3  /  DBili  x   /  AST  25  /  ALT  20  /  AlkPhos  99  -28    PT/INR - ( 2024 13:26 )   PT: 11.2 sec;   INR: 1.07 ratio         PTT - ( 2024 13:26 )  PTT:35.9 sec  Urinalysis Basic - ( 2024 20:38 )    Color: Yellow / Appearance: Cloudy / S.017 / pH: x  Gluc: x / Ketone: Negative mg/dL  / Bili: Negative / Urobili: 0.2 mg/dL   Blood: x / Protein: Negative mg/dL / Nitrite: Negative   Leuk Esterase: Negative / RBC: 11 /HPF / WBC 2 /HPF   Sq Epi: x / Non Sq Epi: 0 /HPF / Bacteria: Negative /HPF

## 2024-02-29 NOTE — DISCHARGE NOTE PROVIDER - PROVIDER TOKENS
PROVIDER:[TOKEN:[[id number]],FOLLOWUP:[2 weeks],ESTABLISHEDPATIENT:[T]],PROVIDER:[TOKEN:[[id number]],FOLLOWUP:[2 weeks],ESTABLISHEDPATIENT:[T]] PROVIDER:[TOKEN:[[id number]],FOLLOWUP:[2 weeks],ESTABLISHEDPATIENT:[T]],PROVIDER:[TOKEN:[[id number]],FOLLOWUP:[2 weeks],ESTABLISHEDPATIENT:[T]],PROVIDER:[TOKEN:[[id number]],FOLLOWUP:[1 week],ESTABLISHEDPATIENT:[T]]

## 2024-02-29 NOTE — DISCHARGE NOTE PROVIDER - NSDCFUADDAPPT_GEN_ALL_CORE_FT
APPTS ARE READY TO BE MADE: [x] YES    Best Family or Patient Contact (if needed):    Additional Information about above appointments (if needed):    1:   2:   3:     Other comments or requests:    APPTS ARE READY TO BE MADE: [x] YES    Best Family or Patient Contact (if needed):    Additional Information about above appointments (if needed):    1: pcp, derm, rheum follow up  2:   3:     Other comments or requests:    APPTS ARE READY TO BE MADE: [x] YES    Best Family or Patient Contact (if needed):    Additional Information about above appointments (if needed):    1: pcp, derm, rheum follow up  2:   3:     Other comments or requests:     Patient informed us they already have secured a follow up appointment which is not visible on [facility]. Patient is scheduled to see Dr. Timmons today, 3/4, at [address] [place] [address]    Patient informed us they already have secured a follow up appointment which is not visible on [facility]. Patient is scheduled to see Dr. De Leon on Friday 3/8 at [address] [facility] [address]    Patient advised they did not want to proceed with scheduling appointments with the providers on their referrals. They will coordinate care on their own.

## 2024-02-29 NOTE — DISCHARGE NOTE PROVIDER - NSDCMRMEDTOKEN_GEN_ALL_CORE_FT
hydrOXYzine hydrochloride 25 mg oral tablet: 1 tab(s) orally every 6 hours as needed for Itching  triamcinolone 0.1% topical ointment: Apply topically to affected area 2 times a day 1 Apply topically to affected area every 12 hours

## 2024-02-29 NOTE — DISCHARGE NOTE PROVIDER - HOSPITAL COURSE
83-year-old female with history of carotid artery stenosis on Plavix, cognitive issues taking zolpidem and donepezil, who presents with bilateral lower extremity erythematous itchy rash which has been present since Saturday.  Patient states that she had a lesion biopsied by her dermatologist Dr. Xiomara De Leon last Wednesday.  Patient also stated that several hours prior to the rash onset, patient was eating cold cuts/mortadella.  Patient was subsequently seen by her PMD Dr. Toña Timmons who prescribed patient prednisone 5 mg daily, patient also took OTC anti-itch cream.  Patient states that her symptoms have been worsening and the rashes been progressing up bilateral lower extremity.     Pt w generalized rash more so b/l lower extremities. Derm and rheum saw pt, more likely contact dermatitis than vasculitis. PT to go home on triamcinolone ointment for itch and can follow up with her dermatologist and follow up with Dr Jovi lerner.

## 2024-02-29 NOTE — DISCHARGE NOTE PROVIDER - CARE PROVIDER_API CALL
Meghann Espana  Rheumatology  [address] [facility] [facility] [address]  [place] [address]  Phone: [phone]  Fax: [phone]  Established Patient  Follow Up Time: 2 weeks    Xiomara De Leon  Dermatology  [address] [place] [address]  Phone: [phone]  Fax: [phone]  Established Patient  Follow Up Time: 2 weeks   Meghann Espana  Rheumatology  [address] [facility] [facility] [address]  [place] [address]  Phone: [phone]  Fax: [phone]  Established Patient  Follow Up Time: 2 weeks    Xiomara De Leon  Dermatology  [address] [place] [address]  Phone: [phone]  Fax: [phone]  Established Patient  Follow Up Time: 2 weeks    Toña Timmons  Internal Medicine  [address] [place] [address]  Phone: [phone]  Fax: [phone]  Established Patient  Follow Up Time: 1 week

## 2024-02-29 NOTE — DISCHARGE NOTE PROVIDER - NSDCCPCAREPLAN_GEN_ALL_CORE_FT
PRINCIPAL DISCHARGE DIAGNOSIS  Diagnosis: Rash  Assessment and Plan of Treatment: You were seen for a rash. We feel it is an allergy or contact dermatitis, which is skin irritation. Use the cream we recommend for itch and if you need to take hydroxyzine or benadryl pills to help with the itch. Follow up with Dr Solano and Dr Espana     PRINCIPAL DISCHARGE DIAGNOSIS  Diagnosis: Rash  Assessment and Plan of Treatment: You were seen for a rash. We feel it is an allergy or contact dermatitis, which is skin irritation. Use the cream we recommend for itch and if you need to take hydroxyzine or benadryl pills to help with the itch. Follow up with Dr Solano and Dr Espana.  Talk to your primary care doctor because this rash may have started when you started taking memantine which could be causing the rash

## 2024-02-29 NOTE — CONSULT NOTE ADULT - SUBJECTIVE AND OBJECTIVE BOX
APOLONIA MARSH  [id number]    HISTORY OF PRESENT ILLNESS:    ED Vitals:  ED Labs:  ED Imaging:  ED Course:    Review of Systems:  Gen:  No fevers/chills, weight loss, alopecia  HEENT: No blurry vision, dry eyes, dry mouth, no difficulty swallowing, no oral or nasal ulcers  CVS: No chest pain    Resp: No SOB  GI: No N/V/C/D/abdominal pain, hematochezia  : No hematuria or dysuria  MSK: No joint pain or muscle weakness  Skin: No rash  Neuro: No headaches    MEDICATIONS  (STANDING):    MEDICATIONS  (PRN):  acetaminophen     Tablet .. 650 milliGRAM(s) Oral every 6 hours PRN Mild Pain (1 - 3)  diphenhydrAMINE 25 milliGRAM(s) Oral once PRN Rash and/or Itching      Allergies    No Known Allergies    Intolerances        PERTINENT MEDICATION HISTORY:    MEDICATIONS:    ALLERGIES:    SURGERIES:    HOSPITALIZATIONS:    FAMILY HISTORY:    TRAVEL HISTORY:    SOCIAL HISTORY:    FAMILY HISTORY:      Vital Signs Last 24 Hrs  T(C): 36.4 (2024 12:04), Max: 36.8 (2024 18:51)  T(F): 97.6 (2024 12:04), Max: 98.2 (2024 18:51)  HR: 60 (2024 12:04) (60 - 80)  BP: 111/67 (2024 12:04) (110/66 - 134/79)  BP(mean): 97 (2024 19:30) (97 - 97)  RR: 18 (2024 12:04) (18 - 20)  SpO2: 97% (2024 12:04) (96% - 100%)    Parameters below as of 2024 12:04  Patient On (Oxygen Delivery Method): room air        Physical Exam:  General: Breathing comfortably on room air, no distress  HEENT: EOMI, MMM, no oral ulcers  CVS: +S1/S2, RRR  Resp: CTA b/l. No crackles/wheezing  GI: Soft, NT/ND +BS  MSK: 5/5 muscle strength in arms and legs. B/l shoulder, elbow, wrist, MCP/PIP/DIP, Knee, ankle w/o swelling/erythema/or tenderness  Skin: no visible rashes    LABS:                        13.7   12.54 )-----------( 252      ( 2024 13:26 )             40.6         139  |  102  |  17  ----------------------------<  111<H>  4.1   |  25  |  0.48<L>    Ca    9.7      2024 13:26    TPro  7.4  /  Alb  4.3  /  TBili  0.3  /  DBili  x   /  AST  25  /  ALT  20  /  AlkPhos  99      PT/INR - ( 2024 13:26 )   PT: 11.2 sec;   INR: 1.07 ratio         PTT - ( 2024 13:26 )  PTT:35.9 sec  Urinalysis Basic - ( 2024 20:38 )    Color: Yellow / Appearance: Cloudy / S.017 / pH: x  Gluc: x / Ketone: Negative mg/dL  / Bili: Negative / Urobili: 0.2 mg/dL   Blood: x / Protein: Negative mg/dL / Nitrite: Negative   Leuk Esterase: Negative / RBC: 11 /HPF / WBC 2 /HPF   Sq Epi: x / Non Sq Epi: 0 /HPF / Bacteria: Negative /HPF        RADIOLOGY & ADDITIONAL STUDIES: Reviewed     APOLONIA MARSH  [id number]    HPI: 82 yo F PMH carotid artery stenosis on Plavix, cognitive issues (on zolpidem/donepezil), b/l knee OA, shingles p/w 6 days itchy red rash that began on her bilateral lower legs and began to spread to upper legs, arms and neck. [employer] : [id number] [place]. She was seen by her PCP on monday who prescribed prednisone 5mg daily. Pt says the prednisone has not helped muych. Of note, pt saw her dermatologist for annual skin check  and had mole biopsy on left lower leg. Was told to apply cream on wound daily which she did.  says she picked up new prescription of memantine a couple days before rash started. Denies hx of any prior rash, except for one time episode of previous shingles in past many years ago.     ROS:  -denies fever, chills, chest pain, abdominal pain, URI symptoms, sicca, raynauds, nasal/oral ulcers  -has chronic b/l knee pain worse w/ use     Meds:   Social hx: no tobacco/alcohol    MEDICATIONS  (STANDING):    MEDICATIONS  (PRN):  acetaminophen     Tablet .. 650 milliGRAM(s) Oral every 6 hours PRN Mild Pain (1 - 3)  diphenhydrAMINE 25 milliGRAM(s) Oral once PRN Rash and/or Itching      Allergies    No Known Allergies    Intolerances       Vital Signs Last 24 Hrs  T(C): 36.4 (2024 12:04), Max: 36.8 (2024 18:51)  T(F): 97.6 (2024 12:04), Max: 98.2 (2024 18:51)  HR: 60 (2024 12:04) (60 - 80)  BP: 111/67 (2024 12:04) (110/66 - 134/79)  BP(mean): 97 (2024 19:30) (97 - 97)  RR: 18 (2024 12:04) (18 - 20)  SpO2: 97% (2024 12:04) (96% - 100%)    Parameters below as of 2024 12:04  Patient On (Oxygen Delivery Method): room air        Physical Exam:  General: Breathing comfortably on room air, no distress  HEENT: EOMI, MMM, no oral ulcers  CVS: +S1/S2, RRR  Resp: CTA b/l. No crackles/wheezing  GI: Soft, NT/ND +BS  MSK: b/l knee crepitus, no effusion, intact ROM, squating of b/l hands w/ Leslie/Heberden's nodes  Skin: red confluent erythema on b/l shins/ankles, +hives upper arms, and left neck      LABS:                        13.7   12.54 )-----------( 252      ( 2024 13:26 )             40.6         139  |  102  |  17  ----------------------------<  111<H>  4.1   |  25  |  0.48<L>    Ca    9.7      2024 13:26    TPro  7.4  /  Alb  4.3  /  TBili  0.3  /  DBili  x   /  AST  25  /  ALT  20  /  AlkPhos  99      PT/INR - ( 2024 13:26 )   PT: 11.2 sec;   INR: 1.07 ratio         PTT - ( 2024 13:26 )  PTT:35.9 sec  Urinalysis Basic - ( 2024 20:38 )    Color: Yellow / Appearance: Cloudy / S.017 / pH: x  Gluc: x / Ketone: Negative mg/dL  / Bili: Negative / Urobili: 0.2 mg/dL   Blood: x / Protein: Negative mg/dL / Nitrite: Negative   Leuk Esterase: Negative / RBC: 11 /HPF / WBC 2 /HPF   Sq Epi: x / Non Sq Epi: 0 /HPF / Bacteria: Negative /HPF        RADIOLOGY & ADDITIONAL STUDIES: Reviewed

## 2024-03-01 LAB
CULTURE RESULTS: SIGNIFICANT CHANGE UP
SPECIMEN SOURCE: SIGNIFICANT CHANGE UP

## 2025-01-27 ENCOUNTER — APPOINTMENT (OUTPATIENT)
Dept: PODIATRY | Facility: CLINIC | Age: 85
End: 2025-01-27

## 2025-01-27 DIAGNOSIS — E78.5 HYPERLIPIDEMIA, UNSPECIFIED: ICD-10-CM

## 2025-01-27 DIAGNOSIS — L85.1 ACQUIRED KERATOSIS [KERATODERMA] PALMARIS ET PLANTARIS: ICD-10-CM

## 2025-01-27 PROCEDURE — 99203 OFFICE O/P NEW LOW 30 MIN: CPT | Mod: 25

## 2025-01-27 PROCEDURE — 11055 PARING/CUTG B9 HYPRKER LES 1: CPT

## 2025-01-27 RX ORDER — MUPIROCIN 20 MG/G
2 OINTMENT TOPICAL TWICE DAILY
Qty: 1 | Refills: 1 | Status: ACTIVE | COMMUNITY
Start: 2025-01-27 | End: 1900-01-01

## 2025-01-29 PROBLEM — L85.1 KERATODERMA, ACQUIRED: Status: ACTIVE | Noted: 2025-01-28

## 2025-02-03 ENCOUNTER — APPOINTMENT (OUTPATIENT)
Dept: PODIATRY | Facility: CLINIC | Age: 85
End: 2025-02-03

## 2025-02-03 DIAGNOSIS — M20.41 OTHER HAMMER TOE(S) (ACQUIRED), RIGHT FOOT: ICD-10-CM

## 2025-02-03 DIAGNOSIS — M86.9 OSTEOMYELITIS, UNSPECIFIED: ICD-10-CM

## 2025-02-03 PROCEDURE — 99212 OFFICE O/P EST SF 10 MIN: CPT

## 2025-02-03 RX ORDER — CICLOPIROX OLAMINE 7.7 MG/G
0.77 CREAM TOPICAL DAILY
Qty: 15 | Refills: 2 | Status: ACTIVE | COMMUNITY
Start: 2025-02-03 | End: 1900-01-01

## 2025-02-06 PROBLEM — M86.9 OSTEOMYELITIS: Status: ACTIVE | Noted: 2025-01-28

## 2025-02-06 PROBLEM — M20.41 HAMMER TOE OF RIGHT FOOT: Status: ACTIVE | Noted: 2025-01-28
